# Patient Record
Sex: FEMALE | Race: BLACK OR AFRICAN AMERICAN | NOT HISPANIC OR LATINO
[De-identification: names, ages, dates, MRNs, and addresses within clinical notes are randomized per-mention and may not be internally consistent; named-entity substitution may affect disease eponyms.]

---

## 2017-11-01 PROBLEM — Z00.00 ENCOUNTER FOR PREVENTIVE HEALTH EXAMINATION: Status: ACTIVE | Noted: 2017-11-01

## 2017-11-08 ENCOUNTER — APPOINTMENT (OUTPATIENT)
Dept: GASTROENTEROLOGY | Facility: CLINIC | Age: 72
End: 2017-11-08
Payer: MEDICARE

## 2017-11-08 VITALS
DIASTOLIC BLOOD PRESSURE: 75 MMHG | SYSTOLIC BLOOD PRESSURE: 125 MMHG | OXYGEN SATURATION: 100 % | RESPIRATION RATE: 16 BRPM | HEIGHT: 67 IN | WEIGHT: 175 LBS | BODY MASS INDEX: 27.47 KG/M2 | HEART RATE: 87 BPM

## 2017-11-08 VITALS — BODY MASS INDEX: 25.84 KG/M2 | WEIGHT: 165 LBS

## 2017-11-08 DIAGNOSIS — E73.9 LACTOSE INTOLERANCE, UNSPECIFIED: ICD-10-CM

## 2017-11-08 DIAGNOSIS — Z78.9 OTHER SPECIFIED HEALTH STATUS: ICD-10-CM

## 2017-11-08 DIAGNOSIS — F15.90 OTHER STIMULANT USE, UNSPECIFIED, UNCOMPLICATED: ICD-10-CM

## 2017-11-08 PROCEDURE — 99202 OFFICE O/P NEW SF 15 MIN: CPT

## 2017-11-08 RX ORDER — SODIUM SULFATE, POTASSIUM SULFATE, MAGNESIUM SULFATE 17.5; 3.13; 1.6 G/ML; G/ML; G/ML
17.5-3.13-1.6 SOLUTION, CONCENTRATE ORAL
Qty: 1 | Refills: 0 | Status: ACTIVE | COMMUNITY
Start: 2017-11-08 | End: 1900-01-01

## 2017-12-16 LAB
BASOPHILS # BLD AUTO: 0.01 K/UL
BASOPHILS NFR BLD AUTO: 0.2 %
EOSINOPHIL # BLD AUTO: 0.09 K/UL
EOSINOPHIL NFR BLD AUTO: 2.1 %
HCT VFR BLD CALC: 37.6 %
HGB BLD-MCNC: 12.1 G/DL
IMM GRANULOCYTES NFR BLD AUTO: 0 %
LYMPHOCYTES # BLD AUTO: 1.89 K/UL
LYMPHOCYTES NFR BLD AUTO: 44.7 %
MAN DIFF?: NORMAL
MCHC RBC-ENTMCNC: 28.6 PG
MCHC RBC-ENTMCNC: 32.2 GM/DL
MCV RBC AUTO: 88.9 FL
MONOCYTES # BLD AUTO: 0.33 K/UL
MONOCYTES NFR BLD AUTO: 7.8 %
NEUTROPHILS # BLD AUTO: 1.91 K/UL
NEUTROPHILS NFR BLD AUTO: 45.2 %
PLATELET # BLD AUTO: 204 K/UL
RBC # BLD: 4.23 M/UL
RBC # FLD: 13.8 %
WBC # FLD AUTO: 4.23 K/UL

## 2017-12-18 LAB
ANION GAP SERPL CALC-SCNC: 15 MMOL/L
BUN SERPL-MCNC: 12 MG/DL
CALCIUM SERPL-MCNC: 9.6 MG/DL
CHLORIDE SERPL-SCNC: 106 MMOL/L
CO2 SERPL-SCNC: 25 MMOL/L
CREAT SERPL-MCNC: 0.96 MG/DL
GLUCOSE SERPL-MCNC: 105 MG/DL
INR PPP: 0.98 RATIO
POTASSIUM SERPL-SCNC: 4.1 MMOL/L
PT BLD: 11.1 SEC
SODIUM SERPL-SCNC: 146 MMOL/L

## 2017-12-20 ENCOUNTER — MEDICATION RENEWAL (OUTPATIENT)
Age: 72
End: 2017-12-20

## 2017-12-20 RX ORDER — POLYETHYLENE GLYOCOL 3350, SODIUM CHLORIDE, SODIUM BICARBONATE AND POTASSIUM CHLORIDE 420; 11.2; 5.72; 1.48 G/4L; G/4L; G/4L; G/4L
420 POWDER, FOR SOLUTION NASOGASTRIC; ORAL
Qty: 1 | Refills: 0 | Status: ACTIVE | COMMUNITY
Start: 2017-12-20 | End: 1900-01-01

## 2018-01-02 ENCOUNTER — OUTPATIENT (OUTPATIENT)
Dept: OUTPATIENT SERVICES | Facility: HOSPITAL | Age: 73
LOS: 1 days | End: 2018-01-02
Payer: COMMERCIAL

## 2018-01-02 ENCOUNTER — APPOINTMENT (OUTPATIENT)
Dept: GASTROENTEROLOGY | Facility: GI CENTER | Age: 73
End: 2018-01-02
Payer: MEDICARE

## 2018-01-02 ENCOUNTER — RESULT REVIEW (OUTPATIENT)
Age: 73
End: 2018-01-02

## 2018-01-02 DIAGNOSIS — Z12.11 ENCOUNTER FOR SCREENING FOR MALIGNANT NEOPLASM OF COLON: ICD-10-CM

## 2018-01-02 PROCEDURE — 88305 TISSUE EXAM BY PATHOLOGIST: CPT | Mod: 26

## 2018-01-02 PROCEDURE — 45380 COLONOSCOPY AND BIOPSY: CPT | Mod: PT

## 2018-01-02 PROCEDURE — 45385 COLONOSCOPY W/LESION REMOVAL: CPT | Mod: 33

## 2018-01-02 PROCEDURE — 88305 TISSUE EXAM BY PATHOLOGIST: CPT

## 2018-01-02 RX ORDER — DICLOFENAC SODIUM 100 MG/1
100 TABLET, FILM COATED, EXTENDED RELEASE ORAL
Qty: 10 | Refills: 0 | Status: DISCONTINUED | COMMUNITY
Start: 2017-09-09 | End: 2018-01-02

## 2018-01-02 RX ORDER — LATANOPROST/PF 0.005 %
0.01 DROPS OPHTHALMIC (EYE)
Qty: 2 | Refills: 0 | Status: ACTIVE | COMMUNITY
Start: 2017-04-01

## 2018-01-05 LAB — SURGICAL PATHOLOGY FINAL REPORT - CH: SIGNIFICANT CHANGE UP

## 2022-11-15 ENCOUNTER — OFFICE (OUTPATIENT)
Dept: URBAN - METROPOLITAN AREA CLINIC 94 | Facility: CLINIC | Age: 77
Setting detail: OPHTHALMOLOGY
End: 2022-11-15
Payer: MEDICARE

## 2022-11-15 DIAGNOSIS — Z20.822: ICD-10-CM

## 2022-11-15 DIAGNOSIS — Z01.812: ICD-10-CM

## 2022-11-15 PROCEDURE — 99211 OFF/OP EST MAY X REQ PHY/QHP: CPT | Performed by: OPHTHALMOLOGY

## 2022-11-17 ASSESSMENT — REFRACTION_AUTOREFRACTION
OD_SPHERE: -3.25
OD_AXIS: 096
OD_CYLINDER: -2.25
OS_SPHERE: -1.75
OS_CYLINDER: -2.25
OS_AXIS: 089

## 2022-11-17 ASSESSMENT — REFRACTION_CURRENTRX
OS_OVR_VA: 20/
OS_VPRISM_DIRECTION: PROGS
OD_VPRISM_DIRECTION: PROGS
OD_SPHERE: -4.00
OS_VPRISM_DIRECTION: PROGS
OD_AXIS: 092
OS_ADD: +3.50
OS_OVR_VA: 20/
OS_AXIS: 083
OS_ADD: +1.50
OD_ADD: +1.50
OS_CYLINDER: -0.50
OS_AXIS: 105
OD_AXIS: 091
OD_CYLINDER: -0.75
OD_OVR_VA: 20/
OS_SPHERE: -1.50
OD_SPHERE: -3.00
OD_CYLINDER: -1.25
OS_CYLINDER: -1.25
OD_OVR_VA: 20/
OS_SPHERE: -3.25
OD_VPRISM_DIRECTION: SV

## 2022-11-17 ASSESSMENT — AXIALLENGTH_DERIVED
OD_AL: 23.4043
OS_AL: 22.7494
OS_AL: 23.0709
OD_AL: 23.5976
OS_AL: 23.0709
OD_AL: 23.5976

## 2022-11-17 ASSESSMENT — VISUAL ACUITY
OS_BCVA: 20/80+1
OD_BCVA: 20/30

## 2022-11-17 ASSESSMENT — REFRACTION_MANIFEST
OD_VA2: 20/J1
OD_ADD: +2.00
OD_SPHERE: -3.25
OS_VA1: 20/20
OS_VA1: 20/30
OS_CYLINDER: -2.25
OS_AXIS: 089
OS_AXIS: 081
OD_AXIS: 099
OD_VA1: 20/40
OD_VA1: 20/80
OD_AXIS: 096
OD_SPHERE: -3.25
OU_VA: 20/20
OS_VA2: 20/J1
OD_CYLINDER: -2.25
OS_SPHERE: -1.50
OS_CYLINDER: -1.00
OS_ADD: +2.00
OD_CYLINDER: -1.25
OS_SPHERE: -1.75

## 2022-11-17 ASSESSMENT — SUPERFICIAL PUNCTATE KERATITIS (SPK)
OS_SPK: 2+
OD_SPK: 2+

## 2022-11-17 ASSESSMENT — SPHEQUIV_DERIVED
OD_SPHEQUIV: -4.375
OD_SPHEQUIV: -4.375
OS_SPHEQUIV: -2.875
OD_SPHEQUIV: -3.875
OS_SPHEQUIV: -2
OS_SPHEQUIV: -2.875

## 2022-11-17 ASSESSMENT — KERATOMETRY
OD_K1POWER_DIOPTERS: 47.50
METHOD_AUTO_MANUAL: AUTO
OS_K2POWER_DIOPTERS: 48.50
OD_K2POWER_DIOPTERS: 48.75
OS_AXISANGLE_DEGREES: 150
OS_K1POWER_DIOPTERS: 47.50
OD_AXISANGLE_DEGREES: 176

## 2022-11-18 ENCOUNTER — ASC (OUTPATIENT)
Dept: URBAN - METROPOLITAN AREA SURGERY 8 | Facility: SURGERY | Age: 77
Setting detail: OPHTHALMOLOGY
End: 2022-11-18
Payer: MEDICARE

## 2022-11-18 DIAGNOSIS — H26.492: ICD-10-CM

## 2022-11-18 PROCEDURE — 66821 AFTER CATARACT LASER SURGERY: CPT | Performed by: OPHTHALMOLOGY

## 2022-11-18 ASSESSMENT — REFRACTION_CURRENTRX
OS_VPRISM_DIRECTION: PROGS
OD_SPHERE: -4.00
OD_AXIS: 092
OS_OVR_VA: 20/
OD_OVR_VA: 20/
OS_CYLINDER: -0.50
OD_AXIS: 091
OS_ADD: +3.50
OD_ADD: +1.50
OS_AXIS: 105
OD_VPRISM_DIRECTION: PROGS
OD_OVR_VA: 20/
OD_CYLINDER: -0.75
OS_SPHERE: -1.50
OS_CYLINDER: -1.25
OS_OVR_VA: 20/
OS_AXIS: 083
OD_VPRISM_DIRECTION: SV
OS_VPRISM_DIRECTION: PROGS
OD_SPHERE: -3.00
OD_CYLINDER: -1.25
OS_ADD: +1.50
OS_SPHERE: -3.25

## 2022-11-18 ASSESSMENT — KERATOMETRY
OS_AXISANGLE_DEGREES: 150
METHOD_AUTO_MANUAL: AUTO
OS_K1POWER_DIOPTERS: 47.50
OD_K1POWER_DIOPTERS: 47.50
OS_K2POWER_DIOPTERS: 48.50
OD_K2POWER_DIOPTERS: 48.75
OD_AXISANGLE_DEGREES: 176

## 2022-11-18 ASSESSMENT — REFRACTION_AUTOREFRACTION
OD_SPHERE: -3.25
OS_SPHERE: -1.75
OD_AXIS: 096
OS_CYLINDER: -2.25
OS_AXIS: 089
OD_CYLINDER: -2.25

## 2022-11-18 ASSESSMENT — REFRACTION_MANIFEST
OS_AXIS: 089
OD_SPHERE: -3.25
OD_CYLINDER: -2.25
OD_AXIS: 099
OS_ADD: +2.00
OU_VA: 20/20
OD_VA1: 20/40
OS_AXIS: 081
OS_SPHERE: -1.75
OS_VA2: 20/J1
OS_VA1: 20/30
OD_VA2: 20/J1
OS_VA1: 20/20
OS_CYLINDER: -1.00
OD_VA1: 20/80
OD_AXIS: 096
OS_SPHERE: -1.50
OS_CYLINDER: -2.25
OD_ADD: +2.00
OD_CYLINDER: -1.25
OD_SPHERE: -3.25

## 2022-11-18 ASSESSMENT — AXIALLENGTH_DERIVED
OD_AL: 23.5976
OS_AL: 22.7494
OS_AL: 23.0709
OD_AL: 23.5976
OD_AL: 23.4043
OS_AL: 23.0709

## 2022-11-18 ASSESSMENT — SPHEQUIV_DERIVED
OS_SPHEQUIV: -2
OS_SPHEQUIV: -2.875
OD_SPHEQUIV: -4.375
OD_SPHEQUIV: -3.875
OS_SPHEQUIV: -2.875
OD_SPHEQUIV: -4.375

## 2022-11-18 ASSESSMENT — VISUAL ACUITY
OD_BCVA: 20/30
OS_BCVA: 20/80+1

## 2022-12-23 ENCOUNTER — OFFICE (OUTPATIENT)
Dept: URBAN - METROPOLITAN AREA CLINIC 112 | Facility: CLINIC | Age: 77
Setting detail: OPHTHALMOLOGY
End: 2022-12-23

## 2022-12-23 DIAGNOSIS — Z96.1: ICD-10-CM

## 2022-12-23 DIAGNOSIS — H40.1122: ICD-10-CM

## 2022-12-23 DIAGNOSIS — H16.223: ICD-10-CM

## 2022-12-23 DIAGNOSIS — H40.1113: ICD-10-CM

## 2022-12-23 DIAGNOSIS — H26.492: ICD-10-CM

## 2022-12-23 PROCEDURE — 99024 POSTOP FOLLOW-UP VISIT: CPT | Performed by: OPHTHALMOLOGY

## 2022-12-23 ASSESSMENT — AXIALLENGTH_DERIVED
OS_AL: 22.8377
OS_AL: 22.7922
OD_AL: 23.6032
OD_AL: 23.4098
OD_AL: 23.5061
OS_AL: 23.1149
OD_AL: 23.3143
OS_AL: 23.0218

## 2022-12-23 ASSESSMENT — PACHYMETRY
OD_CT_CORRECTION: 3
OS_CT_UM: 479
OD_CT_UM: 508
OS_CT_CORRECTION: 5

## 2022-12-23 ASSESSMENT — KERATOMETRY
OS_K2POWER_DIOPTERS: 48.50
OD_K2POWER_DIOPTERS: 49.25
OS_K1POWER_DIOPTERS: 47.25
OD_AXISANGLE_DEGREES: 007
METHOD_AUTO_MANUAL: AUTO
OS_AXISANGLE_DEGREES: 170
OD_K1POWER_DIOPTERS: 47.50

## 2022-12-23 ASSESSMENT — REFRACTION_CURRENTRX
OS_AXIS: 083
OD_ADD: +1.75
OS_CYLINDER: -0.50
OS_CYLINDER: -1.25
OD_AXIS: 091
OD_VPRISM_DIRECTION: PROGS
OS_VPRISM_DIRECTION: PROGS
OS_SPHERE: -1.50
OS_OVR_VA: 20/
OD_VPRISM_DIRECTION: PROGS
OD_SPHERE: -3.00
OD_SPHERE: -3.00
OD_SPHERE: -4.00
OS_OVR_VA: 20/
OS_ADD: +2.00
OD_ADD: +1.50
OD_CYLINDER: -1.25
OS_CYLINDER: -1.00
OD_AXIS: 092
OD_CYLINDER: -1.25
OS_AXIS: 082
OS_AXIS: 105
OS_ADD: +3.50
OS_VPRISM_DIRECTION: PROGS
OS_ADD: +1.50
OD_AXIS: 100
OD_CYLINDER: -0.75
OD_OVR_VA: 20/
OD_VPRISM_DIRECTION: SV
OS_SPHERE: -3.25
OD_OVR_VA: 20/
OD_OVR_VA: 20/
OS_VPRISM_DIRECTION: PROGS
OS_OVR_VA: 20/
OS_SPHERE: -1.50

## 2022-12-23 ASSESSMENT — SUPERFICIAL PUNCTATE KERATITIS (SPK)
OD_SPK: 2+
OS_SPK: 2+

## 2022-12-23 ASSESSMENT — SPHEQUIV_DERIVED
OD_SPHEQUIV: -4.375
OS_SPHEQUIV: -2.125
OD_SPHEQUIV: -4.125
OD_SPHEQUIV: -3.875
OS_SPHEQUIV: -2
OS_SPHEQUIV: -2.875
OS_SPHEQUIV: -2.625
OD_SPHEQUIV: -4.625

## 2022-12-23 ASSESSMENT — REFRACTION_MANIFEST
OD_AXIS: 090
OD_VA1: 20/40
OS_CYLINDER: -1.00
OS_VA1: 20/30
OD_CYLINDER: -1.75
OS_SPHERE: -1.00
OD_AXIS: 099
OD_CYLINDER: -1.25
OS_AXIS: 085
OS_SPHERE: -1.75
OS_VA1: 20/20
OD_CYLINDER: -2.25
OS_AXIS: 089
OD_SPHERE: -3.25
OS_ADD: +2.00
OS_VA1: 20/20
OD_ADD: +2.00
OS_VA2: 20/J1
OS_AXIS: 081
OU_VA: 20/20
OD_VA1: 20/80
OD_ADD: +2.00
OD_SPHERE: -3.25
OS_ADD: +2.00
OS_CYLINDER: -2.25
OD_AXIS: 096
OS_CYLINDER: -2.25
OD_VA2: 20/J1
OD_SPHERE: -3.25
OS_SPHERE: -1.50
OD_VA1: 20/100

## 2022-12-23 ASSESSMENT — REFRACTION_AUTOREFRACTION
OD_AXIS: 093
OS_SPHERE: -1.50
OS_CYLINDER: -2.25
OD_CYLINDER: -2.25
OS_AXIS: 085
OD_SPHERE: -3.50

## 2022-12-23 ASSESSMENT — VISUAL ACUITY
OD_BCVA: 20/30-1
OS_BCVA: 20/100-1

## 2022-12-23 ASSESSMENT — CONFRONTATIONAL VISUAL FIELD TEST (CVF)
OD_FINDINGS: FULL
OS_FINDINGS: FULL

## 2022-12-23 ASSESSMENT — TONOMETRY
OD_IOP_MMHG: 13
OS_IOP_MMHG: 13

## 2023-04-01 ENCOUNTER — OFFICE (OUTPATIENT)
Dept: URBAN - METROPOLITAN AREA CLINIC 112 | Facility: CLINIC | Age: 78
Setting detail: OPHTHALMOLOGY
End: 2023-04-01
Payer: MEDICARE

## 2023-04-01 DIAGNOSIS — H40.1113: ICD-10-CM

## 2023-04-01 DIAGNOSIS — H26.492: ICD-10-CM

## 2023-04-01 DIAGNOSIS — H16.223: ICD-10-CM

## 2023-04-01 DIAGNOSIS — H40.1122: ICD-10-CM

## 2023-04-01 PROCEDURE — 92020 GONIOSCOPY: CPT | Performed by: OPHTHALMOLOGY

## 2023-04-01 PROCEDURE — 92083 EXTENDED VISUAL FIELD XM: CPT | Performed by: OPHTHALMOLOGY

## 2023-04-01 PROCEDURE — 99214 OFFICE O/P EST MOD 30 MIN: CPT | Performed by: OPHTHALMOLOGY

## 2023-04-01 ASSESSMENT — REFRACTION_CURRENTRX
OD_OVR_VA: 20/
OD_CYLINDER: -1.25
OD_ADD: +1.75
OS_CYLINDER: -0.50
OS_AXIS: 083
OD_VPRISM_DIRECTION: SV
OD_SPHERE: -4.00
OS_VPRISM_DIRECTION: PROGS
OD_AXIS: 100
OS_CYLINDER: -1.25
OD_ADD: +1.50
OD_CYLINDER: -0.75
OD_AXIS: 092
OS_ADD: +2.00
OS_OVR_VA: 20/
OS_VPRISM_DIRECTION: PROGS
OS_ADD: +3.50
OS_SPHERE: -1.50
OD_SPHERE: -3.00
OD_SPHERE: -3.00
OS_OVR_VA: 20/
OS_VPRISM_DIRECTION: PROGS
OS_CYLINDER: -1.00
OD_AXIS: 091
OD_CYLINDER: -1.25
OS_SPHERE: -1.50
OS_SPHERE: -3.25
OS_ADD: +1.50
OS_OVR_VA: 20/
OD_OVR_VA: 20/
OD_VPRISM_DIRECTION: PROGS
OS_AXIS: 105
OD_VPRISM_DIRECTION: PROGS
OS_AXIS: 082
OD_OVR_VA: 20/

## 2023-04-01 ASSESSMENT — REFRACTION_MANIFEST
OD_CYLINDER: -2.25
OS_AXIS: 085
OS_SPHERE: -1.50
OS_VA2: 20/J1
OD_ADD: +2.00
OD_CYLINDER: -1.75
OS_AXIS: 089
OD_SPHERE: -3.25
OS_ADD: +2.00
OS_AXIS: 081
OS_CYLINDER: -1.00
OD_VA1: 20/40
OS_ADD: +2.00
OS_VA1: 20/20
OD_AXIS: 096
OS_SPHERE: -1.75
OD_AXIS: 099
OD_CYLINDER: -1.25
OD_SPHERE: -3.25
OD_SPHERE: -3.25
OS_CYLINDER: -2.25
OS_VA1: 20/30
OD_VA2: 20/J1
OD_VA1: 20/80
OS_CYLINDER: -2.25
OD_VA1: 20/100
OU_VA: 20/20
OD_AXIS: 090
OD_ADD: +2.00
OS_SPHERE: -1.00
OS_VA1: 20/20

## 2023-04-01 ASSESSMENT — PACHYMETRY
OS_CT_UM: 479
OD_CT_CORRECTION: 3
OD_CT_UM: 508
OS_CT_CORRECTION: 5

## 2023-04-01 ASSESSMENT — SPHEQUIV_DERIVED
OS_SPHEQUIV: -2.125
OS_SPHEQUIV: -2.875
OD_SPHEQUIV: -4.375
OD_SPHEQUIV: -4.75
OS_SPHEQUIV: -2
OS_SPHEQUIV: -2.5
OD_SPHEQUIV: -3.875
OD_SPHEQUIV: -4.125

## 2023-04-01 ASSESSMENT — AXIALLENGTH_DERIVED
OD_AL: 23.652
OS_AL: 22.932
OS_AL: 22.7948
OS_AL: 23.0709
OD_AL: 23.4098
OD_AL: 23.5061
OD_AL: 23.3143
OS_AL: 22.7494

## 2023-04-01 ASSESSMENT — REFRACTION_AUTOREFRACTION
OS_CYLINDER: -2.50
OS_AXIS: 093
OD_AXIS: 100
OD_SPHERE: -3.75
OS_SPHERE: -1.25
OD_CYLINDER: -2.00

## 2023-04-01 ASSESSMENT — KERATOMETRY
OS_K2POWER_DIOPTERS: 48.75
OS_K1POWER_DIOPTERS: 47.25
METHOD_AUTO_MANUAL: AUTO
OD_K1POWER_DIOPTERS: 47.75
OS_AXISANGLE_DEGREES: 176
OD_K2POWER_DIOPTERS: 49.00
OD_AXISANGLE_DEGREES: 021

## 2023-04-01 ASSESSMENT — VISUAL ACUITY
OS_BCVA: 20/150
OD_BCVA: 20/25-1

## 2023-04-01 ASSESSMENT — SUPERFICIAL PUNCTATE KERATITIS (SPK)
OS_SPK: 2+
OD_SPK: 2+

## 2023-04-01 ASSESSMENT — CONFRONTATIONAL VISUAL FIELD TEST (CVF)
OD_FINDINGS: FULL
OS_FINDINGS: FULL

## 2023-04-01 ASSESSMENT — TONOMETRY
OS_IOP_MMHG: 13
OD_IOP_MMHG: 16

## 2023-04-11 ENCOUNTER — RX ONLY (RX ONLY)
Age: 78
End: 2023-04-11

## 2023-04-11 RX ORDER — LATANOPROST 50 UG/ML
SOLUTION/ DROPS OPHTHALMIC
Qty: 15 | Refills: 3 | Status: ACTIVE | OUTPATIENT

## 2023-04-27 ENCOUNTER — ASC (OUTPATIENT)
Dept: URBAN - METROPOLITAN AREA SURGERY 8 | Facility: SURGERY | Age: 78
Setting detail: OPHTHALMOLOGY
End: 2023-04-27
Payer: MEDICARE

## 2023-04-27 DIAGNOSIS — H40.1113: ICD-10-CM

## 2023-04-27 PROCEDURE — 65855 TRABECULOPLASTY LASER SURG: CPT | Performed by: OPHTHALMOLOGY

## 2023-04-27 ASSESSMENT — REFRACTION_MANIFEST
OD_ADD: +2.00
OS_CYLINDER: -2.25
OD_AXIS: 090
OD_AXIS: 099
OD_VA1: 20/100
OS_CYLINDER: -2.25
OS_ADD: +2.00
OS_AXIS: 085
OD_VA1: 20/80
OD_SPHERE: -3.25
OD_SPHERE: -3.25
OS_AXIS: 081
OS_VA1: 20/30
OD_CYLINDER: -2.25
OU_VA: 20/20
OD_CYLINDER: -1.75
OD_CYLINDER: -1.25
OD_SPHERE: -3.25
OD_VA2: 20/J1
OS_VA1: 20/20
OS_ADD: +2.00
OD_VA1: 20/40
OS_VA2: 20/J1
OS_SPHERE: -1.75
OS_VA1: 20/20
OS_AXIS: 089
OS_SPHERE: -1.00
OS_SPHERE: -1.50
OD_ADD: +2.00
OS_CYLINDER: -1.00
OD_AXIS: 096

## 2023-04-27 ASSESSMENT — REFRACTION_CURRENTRX
OS_CYLINDER: -0.50
OS_SPHERE: -3.25
OD_SPHERE: -3.00
OS_VPRISM_DIRECTION: PROGS
OS_ADD: +1.50
OD_VPRISM_DIRECTION: PROGS
OS_OVR_VA: 20/
OD_CYLINDER: -0.75
OS_AXIS: 105
OD_OVR_VA: 20/
OS_ADD: +3.50
OD_OVR_VA: 20/
OD_OVR_VA: 20/
OS_ADD: +2.00
OS_VPRISM_DIRECTION: PROGS
OD_AXIS: 092
OS_SPHERE: -1.50
OS_OVR_VA: 20/
OS_SPHERE: -1.50
OD_ADD: +1.75
OD_VPRISM_DIRECTION: PROGS
OD_CYLINDER: -1.25
OD_ADD: +1.50
OS_AXIS: 082
OD_VPRISM_DIRECTION: SV
OS_OVR_VA: 20/
OS_CYLINDER: -1.25
OD_SPHERE: -4.00
OD_SPHERE: -3.00
OS_VPRISM_DIRECTION: PROGS
OS_CYLINDER: -1.00
OD_AXIS: 100
OD_CYLINDER: -1.25
OD_AXIS: 091
OS_AXIS: 083

## 2023-04-27 ASSESSMENT — SPHEQUIV_DERIVED
OD_SPHEQUIV: -4.375
OS_SPHEQUIV: -2.875
OD_SPHEQUIV: -4.125
OD_SPHEQUIV: -3.875
OD_SPHEQUIV: -4.75
OS_SPHEQUIV: -2
OS_SPHEQUIV: -2.125
OS_SPHEQUIV: -2.5

## 2023-04-27 ASSESSMENT — KERATOMETRY
OS_K2POWER_DIOPTERS: 48.75
OS_AXISANGLE_DEGREES: 176
METHOD_AUTO_MANUAL: AUTO
OD_K2POWER_DIOPTERS: 49.00
OS_K1POWER_DIOPTERS: 47.25
OD_AXISANGLE_DEGREES: 021
OD_K1POWER_DIOPTERS: 47.75

## 2023-04-27 ASSESSMENT — AXIALLENGTH_DERIVED
OS_AL: 22.7948
OD_AL: 23.4098
OS_AL: 22.7494
OD_AL: 23.3143
OD_AL: 23.5061
OS_AL: 22.932
OS_AL: 23.0709
OD_AL: 23.652

## 2023-04-27 ASSESSMENT — REFRACTION_AUTOREFRACTION
OS_SPHERE: -1.25
OD_SPHERE: -3.75
OD_CYLINDER: -2.00
OS_CYLINDER: -2.50
OS_AXIS: 093
OD_AXIS: 100

## 2023-04-27 ASSESSMENT — VISUAL ACUITY
OS_BCVA: 20/150
OD_BCVA: 20/25-1

## 2023-04-28 ENCOUNTER — ASC (OUTPATIENT)
Dept: URBAN - METROPOLITAN AREA SURGERY 8 | Facility: SURGERY | Age: 78
Setting detail: OPHTHALMOLOGY
End: 2023-04-28
Payer: MEDICARE

## 2023-04-28 DIAGNOSIS — H40.1123: ICD-10-CM

## 2023-04-28 PROCEDURE — 65855 TRABECULOPLASTY LASER SURG: CPT | Performed by: OPHTHALMOLOGY

## 2023-04-28 ASSESSMENT — REFRACTION_MANIFEST
OD_SPHERE: -3.25
OS_VA2: 20/J1
OS_SPHERE: -1.00
OS_AXIS: 085
OD_SPHERE: -3.25
OD_AXIS: 090
OS_CYLINDER: -2.25
OS_CYLINDER: -2.25
OS_VA1: 20/30
OS_ADD: +2.00
OD_VA1: 20/80
OD_SPHERE: -3.25
OD_AXIS: 099
OS_SPHERE: -1.50
OS_VA1: 20/20
OS_SPHERE: -1.75
OD_VA1: 20/100
OD_CYLINDER: -1.75
OD_ADD: +2.00
OS_CYLINDER: -1.00
OD_CYLINDER: -2.25
OD_ADD: +2.00
OD_VA2: 20/J1
OS_AXIS: 089
OU_VA: 20/20
OS_AXIS: 081
OD_CYLINDER: -1.25
OD_AXIS: 096
OD_VA1: 20/40
OS_ADD: +2.00
OS_VA1: 20/20

## 2023-04-28 ASSESSMENT — REFRACTION_CURRENTRX
OD_CYLINDER: -1.25
OS_ADD: +1.50
OS_OVR_VA: 20/
OD_AXIS: 092
OD_CYLINDER: -1.25
OS_CYLINDER: -1.00
OD_VPRISM_DIRECTION: SV
OD_OVR_VA: 20/
OD_ADD: +1.75
OS_SPHERE: -1.50
OD_AXIS: 100
OS_AXIS: 105
OS_CYLINDER: -0.50
OS_ADD: +3.50
OS_SPHERE: -1.50
OD_SPHERE: -3.00
OS_SPHERE: -3.25
OD_VPRISM_DIRECTION: PROGS
OD_OVR_VA: 20/
OD_AXIS: 091
OD_SPHERE: -3.00
OS_CYLINDER: -1.25
OS_AXIS: 082
OS_VPRISM_DIRECTION: PROGS
OS_ADD: +2.00
OS_OVR_VA: 20/
OS_VPRISM_DIRECTION: PROGS
OD_VPRISM_DIRECTION: PROGS
OD_CYLINDER: -0.75
OS_VPRISM_DIRECTION: PROGS
OD_SPHERE: -4.00
OD_ADD: +1.50
OD_OVR_VA: 20/
OS_OVR_VA: 20/
OS_AXIS: 083

## 2023-04-28 ASSESSMENT — VISUAL ACUITY
OS_BCVA: 20/150
OD_BCVA: 20/25-1

## 2023-04-28 ASSESSMENT — AXIALLENGTH_DERIVED
OD_AL: 23.652
OD_AL: 23.3143
OS_AL: 22.7494
OD_AL: 23.5061
OS_AL: 22.932
OS_AL: 22.7948
OD_AL: 23.4098
OS_AL: 23.0709

## 2023-04-28 ASSESSMENT — KERATOMETRY
OD_AXISANGLE_DEGREES: 021
OS_K1POWER_DIOPTERS: 47.25
OD_K1POWER_DIOPTERS: 47.75
OS_AXISANGLE_DEGREES: 176
OD_K2POWER_DIOPTERS: 49.00
METHOD_AUTO_MANUAL: AUTO
OS_K2POWER_DIOPTERS: 48.75

## 2023-04-28 ASSESSMENT — SPHEQUIV_DERIVED
OD_SPHEQUIV: -4.75
OD_SPHEQUIV: -4.125
OS_SPHEQUIV: -2.5
OS_SPHEQUIV: -2.875
OD_SPHEQUIV: -4.375
OD_SPHEQUIV: -3.875
OS_SPHEQUIV: -2.125
OS_SPHEQUIV: -2

## 2023-04-28 ASSESSMENT — REFRACTION_AUTOREFRACTION
OD_AXIS: 100
OS_AXIS: 093
OD_CYLINDER: -2.00
OS_CYLINDER: -2.50
OD_SPHERE: -3.75
OS_SPHERE: -1.25

## 2023-06-09 ENCOUNTER — OFFICE (OUTPATIENT)
Dept: URBAN - METROPOLITAN AREA CLINIC 112 | Facility: CLINIC | Age: 78
Setting detail: OPHTHALMOLOGY
End: 2023-06-09
Payer: MEDICARE

## 2023-06-09 ENCOUNTER — RX ONLY (RX ONLY)
Age: 78
End: 2023-06-09

## 2023-06-09 DIAGNOSIS — H40.1123: ICD-10-CM

## 2023-06-09 PROCEDURE — 99213 OFFICE O/P EST LOW 20 MIN: CPT | Performed by: OPHTHALMOLOGY

## 2023-06-09 ASSESSMENT — CONFRONTATIONAL VISUAL FIELD TEST (CVF)
OS_FINDINGS: FULL
OD_FINDINGS: FULL

## 2023-06-09 ASSESSMENT — REFRACTION_CURRENTRX
OS_SPHERE: -3.25
OD_VPRISM_DIRECTION: PROGS
OS_SPHERE: -1.50
OS_OVR_VA: 20/
OD_AXIS: 091
OS_AXIS: 105
OS_ADD: +1.50
OD_CYLINDER: -1.25
OD_OVR_VA: 20/
OS_OVR_VA: 20/
OD_VPRISM_DIRECTION: PROGS
OS_ADD: +2.00
OS_OVR_VA: 20/
OD_OVR_VA: 20/
OS_VPRISM_DIRECTION: PROGS
OD_CYLINDER: -0.75
OD_VPRISM_DIRECTION: SV
OS_CYLINDER: -1.00
OD_CYLINDER: -1.25
OS_VPRISM_DIRECTION: PROGS
OS_ADD: +3.50
OD_ADD: +1.75
OS_CYLINDER: -0.50
OD_SPHERE: -4.00
OS_SPHERE: -1.50
OS_AXIS: 082
OD_AXIS: 092
OD_SPHERE: -3.00
OD_ADD: +1.50
OS_CYLINDER: -1.25
OD_OVR_VA: 20/
OS_VPRISM_DIRECTION: PROGS
OD_SPHERE: -3.00
OD_AXIS: 100
OS_AXIS: 083

## 2023-06-09 ASSESSMENT — KERATOMETRY
OD_K1POWER_DIOPTERS: 47.25
OS_K1POWER_DIOPTERS: 47.25
OS_AXISANGLE_DEGREES: 002
OS_K2POWER_DIOPTERS: 48.00
METHOD_AUTO_MANUAL: AUTO
OD_AXISANGLE_DEGREES: 017
OD_K2POWER_DIOPTERS: 48.50

## 2023-06-09 ASSESSMENT — REFRACTION_MANIFEST
OS_VA2: 20/J1
OS_VA1: 20/30
OS_VA1: 20/20
OD_ADD: +2.00
OD_VA1: 20/40
OS_AXIS: 085
OS_AXIS: 089
OS_CYLINDER: -2.25
OU_VA: 20/20
OD_CYLINDER: -1.75
OS_ADD: +2.00
OS_VA1: 20/20
OS_SPHERE: -1.50
OD_SPHERE: -3.25
OS_CYLINDER: -2.25
OD_VA1: 20/100
OD_CYLINDER: -2.25
OD_CYLINDER: -1.25
OS_AXIS: 081
OD_AXIS: 099
OD_VA1: 20/80
OS_ADD: +2.00
OS_SPHERE: -1.00
OS_CYLINDER: -1.00
OD_SPHERE: -3.25
OD_VA2: 20/J1
OD_ADD: +2.00
OD_AXIS: 090
OD_AXIS: 096
OD_SPHERE: -3.25
OS_SPHERE: -1.75

## 2023-06-09 ASSESSMENT — REFRACTION_AUTOREFRACTION
OS_CYLINDER: -1.75
OD_CYLINDER: -1.75
OD_SPHERE: -3.50
OD_AXIS: 101
OS_AXIS: 089
OS_SPHERE: -1.25

## 2023-06-09 ASSESSMENT — SPHEQUIV_DERIVED
OS_SPHEQUIV: -2.875
OS_SPHEQUIV: -2.125
OD_SPHEQUIV: -4.375
OS_SPHEQUIV: -2
OD_SPHEQUIV: -3.875
OD_SPHEQUIV: -4.375
OS_SPHEQUIV: -2.125
OD_SPHEQUIV: -4.125

## 2023-06-09 ASSESSMENT — PACHYMETRY
OD_CT_UM: 508
OS_CT_CORRECTION: 5
OS_CT_UM: 479
OD_CT_CORRECTION: 3

## 2023-06-09 ASSESSMENT — SUPERFICIAL PUNCTATE KERATITIS (SPK)
OD_SPK: 2+
OS_SPK: 2+

## 2023-06-09 ASSESSMENT — VISUAL ACUITY
OS_BCVA: 20/100-1
OD_BCVA: 20/30+1

## 2023-06-09 ASSESSMENT — TONOMETRY
OD_IOP_MMHG: 11
OS_IOP_MMHG: 10

## 2023-06-09 ASSESSMENT — AXIALLENGTH_DERIVED
OD_AL: 23.592
OD_AL: 23.495
OS_AL: 22.8782
OS_AL: 23.2034
OD_AL: 23.6899
OD_AL: 23.6899
OS_AL: 22.9241
OS_AL: 22.9241

## 2023-06-29 ENCOUNTER — APPOINTMENT (OUTPATIENT)
Dept: GASTROENTEROLOGY | Facility: CLINIC | Age: 78
End: 2023-06-29
Payer: MEDICARE

## 2023-06-29 ENCOUNTER — NON-APPOINTMENT (OUTPATIENT)
Age: 78
End: 2023-06-29

## 2023-06-29 VITALS
HEIGHT: 67 IN | OXYGEN SATURATION: 98 % | HEART RATE: 70 BPM | BODY MASS INDEX: 25.9 KG/M2 | WEIGHT: 165 LBS | RESPIRATION RATE: 16 BRPM | SYSTOLIC BLOOD PRESSURE: 125 MMHG | DIASTOLIC BLOOD PRESSURE: 70 MMHG

## 2023-06-29 PROCEDURE — 99204 OFFICE O/P NEW MOD 45 MIN: CPT

## 2023-06-29 RX ORDER — SODIUM SULFATE, POTASSIUM SULFATE AND MAGNESIUM SULFATE 1.6; 3.13; 17.5 G/177ML; G/177ML; G/177ML
17.5-3.13-1.6 SOLUTION ORAL
Qty: 2 | Refills: 0 | Status: ACTIVE | COMMUNITY
Start: 2023-06-29 | End: 1900-01-01

## 2023-06-29 NOTE — PHYSICAL EXAM
[Alert] : alert [Normal Voice/Communication] : normal voice/communication [No Acute Distress] : no acute distress [Sclera] : the sclera and conjunctiva were normal [Hearing Threshold Finger Rub Not [place]] : hearing was normal [Normal Lips/Gums] : the lips and gums were normal [Oropharynx] : the oropharynx was normal [Normal Appearance] : the appearance of the neck was normal [No Neck Mass] : no neck mass was observed [No Respiratory Distress] : no respiratory distress [No Acc Muscle Use] : no accessory muscle use [Respiration, Rhythm And Depth] : normal respiratory rhythm and effort [Auscultation Breath Sounds / Voice Sounds] : lungs were clear to auscultation bilaterally [Heart Rate And Rhythm] : heart rate was normal and rhythm regular [None] : no edema [Bowel Sounds] : normal bowel sounds [Abdomen Tenderness] : non-tender [No Masses] : no abdominal mass palpated [Abdomen Soft] : soft [Cervical Lymph Nodes Enlarged Posterior Bilaterally] : no posterior cervical lymphadenopathy [Supraclavicular Lymph Nodes Enlarged Bilaterally] : no supraclavicular lymphadenopathy [Cervical Lymph Nodes Enlarged Anterior Bilaterally] : no anterior cervical lymphadenopathy [No CVA Tenderness] : no CVA  tenderness [No Spinal Tenderness] : no spinal tenderness [Abnormal Walk] : normal gait [Normal Color / Pigmentation] : normal skin color and pigmentation [No Focal Deficits] : no focal deficits [Oriented To Time, Place, And Person] : oriented to person, place, and time

## 2023-06-29 NOTE — ASSESSMENT
[FreeTextEntry1] : 77-year-old female with a history of tubular adenomas on colonoscopy in 2018 presents for surveillance colonoscopy evaluation\par \par \par History of tubular adenoma\par Surveillance colonoscopy recommended\par Procedure and instructions reviewed with patient\par Bowel prep sent to pharmacy

## 2023-07-01 ENCOUNTER — OFFICE (OUTPATIENT)
Dept: URBAN - METROPOLITAN AREA CLINIC 112 | Facility: CLINIC | Age: 78
Setting detail: OPHTHALMOLOGY
End: 2023-07-01
Payer: MEDICARE

## 2023-07-01 DIAGNOSIS — H40.1123: ICD-10-CM

## 2023-07-01 PROCEDURE — 92133 CPTRZD OPH DX IMG PST SGM ON: CPT | Performed by: OPHTHALMOLOGY

## 2023-07-01 PROCEDURE — 92012 INTRM OPH EXAM EST PATIENT: CPT | Performed by: OPHTHALMOLOGY

## 2023-07-01 ASSESSMENT — REFRACTION_CURRENTRX
OD_OVR_VA: 20/
OS_OVR_VA: 20/
OD_CYLINDER: -1.25
OS_ADD: +3.50
OS_AXIS: 082
OS_SPHERE: -3.25
OS_ADD: +1.50
OD_VPRISM_DIRECTION: SV
OS_SPHERE: -1.50
OD_SPHERE: -3.00
OD_OVR_VA: 20/
OS_CYLINDER: -1.25
OS_OVR_VA: 20/
OD_VPRISM_DIRECTION: PROGS
OS_CYLINDER: -1.00
OS_VPRISM_DIRECTION: PROGS
OD_ADD: +1.75
OS_AXIS: 083
OS_AXIS: 105
OD_CYLINDER: -0.75
OD_ADD: +1.50
OS_VPRISM_DIRECTION: PROGS
OS_SPHERE: -1.50
OD_AXIS: 091
OS_ADD: +2.00
OS_VPRISM_DIRECTION: PROGS
OD_CYLINDER: -1.25
OD_AXIS: 100
OD_SPHERE: -3.00
OD_OVR_VA: 20/
OD_AXIS: 092
OS_CYLINDER: -0.50
OD_VPRISM_DIRECTION: PROGS
OS_OVR_VA: 20/
OD_SPHERE: -4.00

## 2023-07-01 ASSESSMENT — PACHYMETRY
OS_CT_UM: 479
OD_CT_CORRECTION: 3
OD_CT_UM: 508
OS_CT_CORRECTION: 5

## 2023-07-01 ASSESSMENT — REFRACTION_AUTOREFRACTION
OD_CYLINDER: -2.25
OS_CYLINDER: -1.50
OS_SPHERE: -1.50
OS_AXIS: 093
OD_AXIS: 104
OD_SPHERE: -3.25

## 2023-07-01 ASSESSMENT — REFRACTION_MANIFEST
OS_CYLINDER: -2.25
OS_SPHERE: -1.00
OD_AXIS: 090
OU_VA: 20/20
OD_AXIS: 096
OD_ADD: +2.00
OS_VA2: 20/J1
OD_CYLINDER: -2.25
OS_ADD: +2.00
OS_AXIS: 089
OD_AXIS: 099
OD_VA1: 20/80
OD_CYLINDER: -1.25
OD_SPHERE: -3.25
OD_VA1: 20/100
OS_SPHERE: -1.75
OS_SPHERE: -1.50
OD_SPHERE: -3.25
OS_VA1: 20/20
OS_AXIS: 085
OD_SPHERE: -3.25
OD_CYLINDER: -1.75
OS_VA1: 20/20
OD_ADD: +2.00
OS_CYLINDER: -2.25
OD_VA2: 20/J1
OS_AXIS: 081
OS_CYLINDER: -1.00
OS_ADD: +2.00
OS_VA1: 20/30
OD_VA1: 20/40

## 2023-07-01 ASSESSMENT — KERATOMETRY
OS_AXISANGLE_DEGREES: 013
OD_K1POWER_DIOPTERS: 47.25
OD_AXISANGLE_DEGREES: 023
METHOD_AUTO_MANUAL: AUTO
OD_K2POWER_DIOPTERS: 48.75
OS_K2POWER_DIOPTERS: 48.00
OS_K1POWER_DIOPTERS: 47.25

## 2023-07-01 ASSESSMENT — SPHEQUIV_DERIVED
OS_SPHEQUIV: -2
OS_SPHEQUIV: -2.875
OD_SPHEQUIV: -4.375
OD_SPHEQUIV: -4.125
OS_SPHEQUIV: -2.25
OD_SPHEQUIV: -3.875
OS_SPHEQUIV: -2.125
OD_SPHEQUIV: -4.375

## 2023-07-01 ASSESSMENT — AXIALLENGTH_DERIVED
OS_AL: 23.2034
OS_AL: 22.9241
OD_AL: 23.5462
OS_AL: 22.9702
OD_AL: 23.6436
OS_AL: 22.8782
OD_AL: 23.4496
OD_AL: 23.6436

## 2023-07-01 ASSESSMENT — CONFRONTATIONAL VISUAL FIELD TEST (CVF)
OD_FINDINGS: FULL
OS_FINDINGS: FULL

## 2023-07-01 ASSESSMENT — SUPERFICIAL PUNCTATE KERATITIS (SPK)
OD_SPK: 2+
OS_SPK: 2+

## 2023-07-01 ASSESSMENT — TONOMETRY
OD_IOP_MMHG: 14
OS_IOP_MMHG: 12

## 2023-07-01 ASSESSMENT — VISUAL ACUITY
OS_BCVA: 20/100-1
OD_BCVA: 20/30

## 2023-09-18 ENCOUNTER — TRANSCRIPTION ENCOUNTER (OUTPATIENT)
Age: 78
End: 2023-09-18

## 2023-09-19 ENCOUNTER — APPOINTMENT (OUTPATIENT)
Dept: GASTROENTEROLOGY | Facility: GI CENTER | Age: 78
End: 2023-09-19
Payer: MEDICARE

## 2023-09-19 ENCOUNTER — OUTPATIENT (OUTPATIENT)
Dept: OUTPATIENT SERVICES | Facility: HOSPITAL | Age: 78
LOS: 1 days | End: 2023-09-19
Payer: COMMERCIAL

## 2023-09-19 ENCOUNTER — RESULT REVIEW (OUTPATIENT)
Age: 78
End: 2023-09-19

## 2023-09-19 DIAGNOSIS — K64.9 UNSPECIFIED HEMORRHOIDS: ICD-10-CM

## 2023-09-19 DIAGNOSIS — K63.5 POLYP OF COLON: ICD-10-CM

## 2023-09-19 DIAGNOSIS — K57.30 DIVERTICULOSIS OF LARGE INTESTINE W/OUT PERFORATION OR ABSCESS W/OUT BLEEDING: ICD-10-CM

## 2023-09-19 PROCEDURE — 45380 COLONOSCOPY AND BIOPSY: CPT | Mod: PT

## 2023-09-19 PROCEDURE — 88305 TISSUE EXAM BY PATHOLOGIST: CPT | Mod: 26

## 2023-09-19 PROCEDURE — 88305 TISSUE EXAM BY PATHOLOGIST: CPT

## 2023-09-19 PROCEDURE — 45380 COLONOSCOPY AND BIOPSY: CPT

## 2023-09-27 LAB — SURGICAL PATHOLOGY STUDY: SIGNIFICANT CHANGE UP

## 2023-11-04 ENCOUNTER — OFFICE (OUTPATIENT)
Dept: URBAN - METROPOLITAN AREA CLINIC 112 | Facility: CLINIC | Age: 78
Setting detail: OPHTHALMOLOGY
End: 2023-11-04
Payer: MEDICARE

## 2023-11-04 DIAGNOSIS — H40.1123: ICD-10-CM

## 2023-11-04 PROCEDURE — 92014 COMPRE OPH EXAM EST PT 1/>: CPT | Performed by: OPHTHALMOLOGY

## 2023-11-04 PROCEDURE — 92250 FUNDUS PHOTOGRAPHY W/I&R: CPT | Performed by: OPHTHALMOLOGY

## 2023-11-04 ASSESSMENT — REFRACTION_MANIFEST
OS_ADD: +2.00
OS_AXIS: 085
OD_SPHERE: -3.25
OS_CYLINDER: -1.00
OD_VA1: 20/40
OD_CYLINDER: -1.25
OD_ADD: +2.00
OD_AXIS: 099
OD_VA2: 20/J1
OD_SPHERE: -3.25
OD_SPHERE: -3.25
OD_ADD: +2.00
OS_VA1: 20/20
OD_CYLINDER: -1.75
OD_VA1: 20/80
OS_SPHERE: -1.00
OS_SPHERE: -1.50
OS_AXIS: 081
OS_ADD: +2.00
OU_VA: 20/20
OS_VA1: 20/30
OS_SPHERE: -1.75
OS_VA2: 20/J1
OD_AXIS: 096
OD_VA1: 20/100
OD_AXIS: 090
OS_CYLINDER: -2.25
OS_CYLINDER: -2.25
OS_VA1: 20/20
OD_CYLINDER: -2.25
OS_AXIS: 089

## 2023-11-04 ASSESSMENT — SPHEQUIV_DERIVED
OS_SPHEQUIV: -2.25
OD_SPHEQUIV: -4.5
OD_SPHEQUIV: -3.875
OD_SPHEQUIV: -4.375
OS_SPHEQUIV: -2
OS_SPHEQUIV: -2.875
OD_SPHEQUIV: -4.125
OS_SPHEQUIV: -2.125

## 2023-11-04 ASSESSMENT — REFRACTION_AUTOREFRACTION
OS_CYLINDER: -1.50
OD_SPHERE: -3.50
OS_SPHERE: -1.50
OD_CYLINDER: -2.00
OD_AXIS: 106
OS_AXIS: 081

## 2023-11-04 ASSESSMENT — REFRACTION_CURRENTRX
OD_AXIS: 100
OD_VPRISM_DIRECTION: PROGS
OS_AXIS: 083
OS_VPRISM_DIRECTION: PROGS
OS_CYLINDER: -0.50
OD_ADD: +1.50
OD_AXIS: 092
OD_SPHERE: -4.00
OS_OVR_VA: 20/
OD_SPHERE: -3.00
OS_SPHERE: -3.25
OD_OVR_VA: 20/
OS_VPRISM_DIRECTION: PROGS
OS_OVR_VA: 20/
OD_ADD: +1.75
OS_OVR_VA: 20/
OS_ADD: +2.00
OS_ADD: +1.50
OD_OVR_VA: 20/
OD_VPRISM_DIRECTION: PROGS
OS_CYLINDER: -1.00
OS_AXIS: 105
OD_CYLINDER: -1.25
OD_VPRISM_DIRECTION: SV
OS_SPHERE: -1.50
OS_ADD: +3.50
OS_CYLINDER: -1.25
OS_SPHERE: -1.50
OD_OVR_VA: 20/
OD_AXIS: 091
OS_VPRISM_DIRECTION: PROGS
OD_CYLINDER: -1.25
OD_SPHERE: -3.00
OD_CYLINDER: -0.75
OS_AXIS: 082

## 2023-11-04 ASSESSMENT — SUPERFICIAL PUNCTATE KERATITIS (SPK)
OD_SPK: 2+
OS_SPK: 2+

## 2023-11-04 ASSESSMENT — CONFRONTATIONAL VISUAL FIELD TEST (CVF)
OD_FINDINGS: FULL
OS_FINDINGS: FULL

## 2024-03-09 ENCOUNTER — OFFICE (OUTPATIENT)
Dept: URBAN - METROPOLITAN AREA CLINIC 112 | Facility: CLINIC | Age: 79
Setting detail: OPHTHALMOLOGY
End: 2024-03-09
Payer: MEDICARE

## 2024-03-09 DIAGNOSIS — H40.1123: ICD-10-CM

## 2024-03-09 PROCEDURE — 92083 EXTENDED VISUAL FIELD XM: CPT | Performed by: OPHTHALMOLOGY

## 2024-03-09 PROCEDURE — 92014 COMPRE OPH EXAM EST PT 1/>: CPT | Performed by: OPHTHALMOLOGY

## 2024-03-09 PROCEDURE — 92020 GONIOSCOPY: CPT | Performed by: OPHTHALMOLOGY

## 2024-03-09 ASSESSMENT — REFRACTION_MANIFEST
OS_AXIS: 081
OS_VA1: 20/20
OD_CYLINDER: -1.75
OD_AXIS: 099
OU_VA: 20/20
OS_ADD: +2.00
OS_ADD: +2.00
OD_ADD: +2.00
OS_SPHERE: -1.75
OD_SPHERE: -3.25
OS_AXIS: 085
OS_SPHERE: -1.00
OD_AXIS: 090
OD_VA1: 20/80
OS_AXIS: 089
OS_CYLINDER: -2.25
OD_SPHERE: -3.25
OD_VA2: 20/J1
OD_SPHERE: -3.25
OS_SPHERE: -1.50
OD_AXIS: 096
OD_CYLINDER: -2.25
OS_CYLINDER: -1.00
OD_VA1: 20/40
OS_VA1: 20/30
OS_CYLINDER: -2.25
OD_CYLINDER: -1.25
OD_VA1: 20/100
OD_ADD: +2.00
OS_VA1: 20/20
OS_VA2: 20/J1

## 2024-03-09 ASSESSMENT — REFRACTION_CURRENTRX
OS_VPRISM_DIRECTION: PROGS
OD_OVR_VA: 20/
OD_SPHERE: -4.00
OS_AXIS: 082
OS_ADD: +3.50
OD_VPRISM_DIRECTION: SV
OD_AXIS: 092
OD_AXIS: 100
OD_AXIS: 091
OD_SPHERE: -3.00
OD_VPRISM_DIRECTION: PROGS
OS_VPRISM_DIRECTION: PROGS
OS_OVR_VA: 20/
OS_AXIS: 083
OD_OVR_VA: 20/
OS_OVR_VA: 20/
OD_CYLINDER: -1.25
OS_ADD: +1.50
OD_ADD: +1.50
OS_VPRISM_DIRECTION: PROGS
OD_SPHERE: -3.00
OS_CYLINDER: -1.25
OS_SPHERE: -3.25
OS_SPHERE: -1.50
OS_SPHERE: -1.50
OD_VPRISM_DIRECTION: PROGS
OS_OVR_VA: 20/
OD_CYLINDER: -1.25
OS_ADD: +2.00
OS_CYLINDER: -1.00
OD_CYLINDER: -0.75
OS_CYLINDER: -0.50
OS_AXIS: 105
OD_ADD: +1.75
OD_OVR_VA: 20/

## 2024-03-09 ASSESSMENT — SPHEQUIV_DERIVED
OD_SPHEQUIV: -3.875
OS_SPHEQUIV: -2.125
OD_SPHEQUIV: -4.375
OD_SPHEQUIV: -4.125
OS_SPHEQUIV: -2.875
OS_SPHEQUIV: -2

## 2024-06-28 ASSESSMENT — REFRACTION_CURRENTRX
OD_SPHERE: -3.00
OS_ADD: +1.50
OS_OVR_VA: 20/
OD_CYLINDER: -1.25
OD_SPHERE: -4.00
OS_AXIS: 105
OD_ADD: +1.75
OS_VPRISM_DIRECTION: PROGS
OD_ADD: +1.50
OS_CYLINDER: -1.25
OS_OVR_VA: 20/
OD_AXIS: 100
OS_SPHERE: -1.50
OD_SPHERE: -3.00
OS_AXIS: 082
OS_AXIS: 083
OD_OVR_VA: 20/
OS_CYLINDER: -0.50
OS_VPRISM_DIRECTION: PROGS
OD_OVR_VA: 20/
OD_VPRISM_DIRECTION: SV
OD_AXIS: 092
OS_ADD: +2.00
OS_CYLINDER: -1.00
OD_AXIS: 091
OS_ADD: +3.50
OD_CYLINDER: -0.75
OS_VPRISM_DIRECTION: PROGS
OS_SPHERE: -3.25
OD_OVR_VA: 20/
OD_CYLINDER: -1.25
OD_VPRISM_DIRECTION: PROGS
OD_VPRISM_DIRECTION: PROGS
OS_OVR_VA: 20/
OS_SPHERE: -1.50

## 2024-06-28 ASSESSMENT — REFRACTION_MANIFEST
OD_AXIS: 096
OD_CYLINDER: -2.25
OS_SPHERE: -1.00
OD_AXIS: 090
OD_VA1: 20/100
OS_CYLINDER: -1.00
OS_SPHERE: -1.75
OD_ADD: +2.00
OD_CYLINDER: -1.75
OD_SPHERE: -3.25
OS_AXIS: 081
OS_AXIS: 085
OS_VA1: 20/20
OS_SPHERE: -1.50
OU_VA: 20/20
OD_VA2: 20/J1
OS_CYLINDER: -2.25
OS_VA1: 20/30
OS_VA2: 20/J1
OD_ADD: +2.00
OD_SPHERE: -3.25
OS_ADD: +2.00
OS_AXIS: 089
OD_CYLINDER: -1.25
OS_CYLINDER: -2.25
OS_ADD: +2.00
OD_AXIS: 099
OD_VA1: 20/80
OD_SPHERE: -3.25
OD_VA1: 20/40
OS_VA1: 20/20

## 2024-06-28 ASSESSMENT — KERATOMETRY
OS_K2POWER_DIOPTERS: 48.25
OS_AXISANGLE_DEGREES: 180
OD_K2POWER_DIOPTERS: 48.50
OS_K1POWER_DIOPTERS: 47.25
METHOD_AUTO_MANUAL: AUTO
OD_AXISANGLE_DEGREES: 024
OD_K1POWER_DIOPTERS: 47.00

## 2024-07-06 ENCOUNTER — OFFICE (OUTPATIENT)
Dept: URBAN - METROPOLITAN AREA CLINIC 94 | Facility: CLINIC | Age: 79
Setting detail: OPHTHALMOLOGY
End: 2024-07-06
Payer: MEDICARE

## 2024-07-06 DIAGNOSIS — H40.1123: ICD-10-CM

## 2024-07-06 PROCEDURE — 92014 COMPRE OPH EXAM EST PT 1/>: CPT | Performed by: OPHTHALMOLOGY

## 2024-07-06 PROCEDURE — 92133 CPTRZD OPH DX IMG PST SGM ON: CPT | Performed by: OPHTHALMOLOGY

## 2024-07-06 ASSESSMENT — CONFRONTATIONAL VISUAL FIELD TEST (CVF)
OS_FINDINGS: FULL
OD_FINDINGS: FULL

## 2024-11-02 ENCOUNTER — OFFICE (OUTPATIENT)
Dept: URBAN - METROPOLITAN AREA CLINIC 112 | Facility: CLINIC | Age: 79
Setting detail: OPHTHALMOLOGY
End: 2024-11-02
Payer: MEDICARE

## 2024-11-02 DIAGNOSIS — H40.1133: ICD-10-CM

## 2024-11-02 DIAGNOSIS — H43.393: ICD-10-CM

## 2024-11-02 DIAGNOSIS — H16.223: ICD-10-CM

## 2024-11-02 DIAGNOSIS — Z96.1: ICD-10-CM

## 2024-11-02 PROCEDURE — 92250 FUNDUS PHOTOGRAPHY W/I&R: CPT | Performed by: OPHTHALMOLOGY

## 2024-11-02 PROCEDURE — 92014 COMPRE OPH EXAM EST PT 1/>: CPT | Performed by: OPHTHALMOLOGY

## 2024-11-02 ASSESSMENT — REFRACTION_MANIFEST
OS_VA1: 20/20
OD_SPHERE: -3.25
OD_VA1: 20/80
OS_CYLINDER: -1.00
OS_VA2: 20/J1
OD_CYLINDER: -1.25
OS_AXIS: 085
OS_ADD: +2.00
OS_AXIS: 089
OS_CYLINDER: -2.25
OS_VA1: 20/30
OS_SPHERE: -1.00
OU_VA: 20/20
OD_CYLINDER: -2.25
OD_VA1: 20/100
OD_VA2: 20/J1
OD_VA1: 20/40
OD_AXIS: 099
OD_CYLINDER: -1.75
OS_CYLINDER: -2.25
OS_AXIS: 081
OD_SPHERE: -3.25
OD_AXIS: 090
OD_ADD: +2.00
OD_ADD: +2.00
OS_SPHERE: -1.50
OD_SPHERE: -3.25
OD_AXIS: 096
OS_SPHERE: -1.75
OS_ADD: +2.00
OS_VA1: 20/20

## 2024-11-02 ASSESSMENT — REFRACTION_CURRENTRX
OS_SPHERE: -1.50
OS_VPRISM_DIRECTION: PROGS
OD_ADD: +1.50
OS_OVR_VA: 20/
OS_AXIS: 105
OD_CYLINDER: -1.25
OS_SPHERE: -3.25
OD_VPRISM_DIRECTION: SV
OD_ADD: +1.75
OD_OVR_VA: 20/
OS_CYLINDER: -0.50
OD_AXIS: 100
OS_OVR_VA: 20/
OD_VPRISM_DIRECTION: PROGS
OS_CYLINDER: -1.25
OD_CYLINDER: -0.75
OD_SPHERE: -3.00
OS_VPRISM_DIRECTION: PROGS
OS_ADD: +3.50
OS_VPRISM_DIRECTION: PROGS
OS_CYLINDER: -1.00
OD_AXIS: 091
OS_AXIS: 082
OS_OVR_VA: 20/
OS_ADD: +1.50
OD_VPRISM_DIRECTION: PROGS
OS_ADD: +2.00
OS_AXIS: 083
OD_AXIS: 092
OD_SPHERE: -3.00
OD_SPHERE: -4.00
OD_CYLINDER: -1.25
OD_OVR_VA: 20/
OD_OVR_VA: 20/
OS_SPHERE: -1.50

## 2024-11-02 ASSESSMENT — KERATOMETRY
OD_K2POWER_DIOPTERS: 48.50
OS_K2POWER_DIOPTERS: 48.00
OS_AXISANGLE_DEGREES: 179
OD_K1POWER_DIOPTERS: 47.00
OD_AXISANGLE_DEGREES: 020
METHOD_AUTO_MANUAL: AUTO
OS_K1POWER_DIOPTERS: 47.50

## 2024-11-02 ASSESSMENT — SUPERFICIAL PUNCTATE KERATITIS (SPK)
OS_SPK: 2+
OD_SPK: 2+

## 2024-11-02 ASSESSMENT — PACHYMETRY
OD_CT_CORRECTION: 3
OS_CT_CORRECTION: 5
OD_CT_UM: 508
OS_CT_UM: 479

## 2024-11-02 ASSESSMENT — CONFRONTATIONAL VISUAL FIELD TEST (CVF)
OD_FINDINGS: FULL
OS_FINDINGS: FULL

## 2024-11-02 ASSESSMENT — REFRACTION_AUTOREFRACTION
OD_SPHERE: -3.00
OD_AXIS: 105
OD_CYLINDER: -2.25
OS_SPHERE: -1.75
OS_CYLINDER: -1.50
OS_AXIS: 089

## 2024-11-02 ASSESSMENT — TONOMETRY
OD_IOP_MMHG: 11
OS_IOP_MMHG: 11

## 2024-11-02 ASSESSMENT — VISUAL ACUITY
OD_BCVA: 20/25
OS_BCVA: 20/100-1

## 2025-03-08 ENCOUNTER — OFFICE (OUTPATIENT)
Dept: URBAN - METROPOLITAN AREA CLINIC 112 | Facility: CLINIC | Age: 80
Setting detail: OPHTHALMOLOGY
End: 2025-03-08
Payer: COMMERCIAL

## 2025-03-08 DIAGNOSIS — H40.1133: ICD-10-CM

## 2025-03-08 DIAGNOSIS — H43.393: ICD-10-CM

## 2025-03-08 DIAGNOSIS — H16.223: ICD-10-CM

## 2025-03-08 PROCEDURE — 92083 EXTENDED VISUAL FIELD XM: CPT | Performed by: OPHTHALMOLOGY

## 2025-03-08 PROCEDURE — 99213 OFFICE O/P EST LOW 20 MIN: CPT | Performed by: OPHTHALMOLOGY

## 2025-03-08 ASSESSMENT — REFRACTION_CURRENTRX
OS_AXIS: 105
OS_ADD: +1.50
OD_VPRISM_DIRECTION: PROGS
OD_VPRISM_DIRECTION: SV
OD_VPRISM_DIRECTION: PROGS
OS_CYLINDER: -1.00
OD_SPHERE: -3.00
OD_ADD: +1.75
OS_OVR_VA: 20/
OS_VPRISM_DIRECTION: PROGS
OS_AXIS: 083
OD_AXIS: 100
OS_VPRISM_DIRECTION: PROGS
OD_AXIS: 092
OS_CYLINDER: -1.25
OD_OVR_VA: 20/
OS_SPHERE: -1.50
OD_AXIS: 091
OD_OVR_VA: 20/
OD_ADD: +1.50
OD_CYLINDER: -0.75
OD_CYLINDER: -1.25
OD_OVR_VA: 20/
OS_AXIS: 082
OS_SPHERE: -1.50
OD_SPHERE: -4.00
OS_VPRISM_DIRECTION: PROGS
OS_ADD: +2.00
OS_SPHERE: -3.25
OS_OVR_VA: 20/
OS_ADD: +3.50
OS_OVR_VA: 20/
OS_CYLINDER: -0.50
OD_CYLINDER: -1.25
OD_SPHERE: -3.00

## 2025-03-08 ASSESSMENT — SUPERFICIAL PUNCTATE KERATITIS (SPK)
OS_SPK: 2+
OD_SPK: 2+

## 2025-03-08 ASSESSMENT — PACHYMETRY
OS_CT_CORRECTION: 5
OD_CT_UM: 508
OD_CT_CORRECTION: 3
OS_CT_UM: 479

## 2025-03-08 ASSESSMENT — REFRACTION_MANIFEST
OD_AXIS: 090
OS_ADD: +2.00
OS_CYLINDER: -2.25
OD_AXIS: 096
OD_CYLINDER: -2.25
OS_VA1: 20/20
OD_CYLINDER: -1.75
OD_SPHERE: -3.25
OD_SPHERE: -3.25
OU_VA: 20/20
OD_VA1: 20/100
OS_ADD: +2.00
OD_VA1: 20/40
OS_SPHERE: -1.75
OD_ADD: +2.00
OS_AXIS: 085
OD_VA1: 20/80
OD_SPHERE: -3.25
OS_VA1: 20/20
OS_AXIS: 089
OS_AXIS: 081
OS_SPHERE: -1.00
OS_CYLINDER: -1.00
OD_AXIS: 099
OS_VA2: 20/J1
OS_SPHERE: -1.50
OS_CYLINDER: -2.25
OS_VA1: 20/30
OD_VA2: 20/J1
OD_ADD: +2.00
OD_CYLINDER: -1.25

## 2025-03-08 ASSESSMENT — CONFRONTATIONAL VISUAL FIELD TEST (CVF)
OS_FINDINGS: FULL
OD_FINDINGS: FULL

## 2025-03-08 ASSESSMENT — REFRACTION_AUTOREFRACTION
OS_SPHERE: -1.50
OS_AXIS: 082
OS_CYLINDER: -1.50
OD_AXIS: 101
OD_SPHERE: -3.25
OD_CYLINDER: -2.50

## 2025-03-08 ASSESSMENT — KERATOMETRY
OS_K2POWER_DIOPTERS: 44.75
METHOD_AUTO_MANUAL: AUTO
OD_K2POWER_DIOPTERS: 48.75
OD_AXISANGLE_DEGREES: 024
OD_K1POWER_DIOPTERS: 47.25
OS_AXISANGLE_DEGREES: 124
OS_K1POWER_DIOPTERS: 47.28

## 2025-03-08 ASSESSMENT — VISUAL ACUITY
OD_BCVA: 20/30-1
OS_BCVA: 20/150

## 2025-03-08 ASSESSMENT — TONOMETRY
OS_IOP_MMHG: 14
OD_IOP_MMHG: 15

## 2025-08-02 ENCOUNTER — OFFICE (OUTPATIENT)
Dept: URBAN - METROPOLITAN AREA CLINIC 112 | Facility: CLINIC | Age: 80
Setting detail: OPHTHALMOLOGY
End: 2025-08-02
Payer: COMMERCIAL

## 2025-08-02 DIAGNOSIS — H40.1133: ICD-10-CM

## 2025-08-02 DIAGNOSIS — H16.223: ICD-10-CM

## 2025-08-02 DIAGNOSIS — Z96.1: ICD-10-CM

## 2025-08-02 DIAGNOSIS — H43.393: ICD-10-CM

## 2025-08-02 PROCEDURE — 92014 COMPRE OPH EXAM EST PT 1/>: CPT | Performed by: OPHTHALMOLOGY

## 2025-08-02 PROCEDURE — 92133 CPTRZD OPH DX IMG PST SGM ON: CPT | Performed by: OPHTHALMOLOGY

## 2025-08-02 PROCEDURE — 92020 GONIOSCOPY: CPT | Performed by: OPHTHALMOLOGY

## 2025-08-02 ASSESSMENT — REFRACTION_MANIFEST
OD_VA1: 20/100
OS_ADD: +2.00
OS_CYLINDER: -2.25
OD_VA1: 20/40
OS_VA1: 20/20
OS_CYLINDER: -2.25
OU_VA: 20/20
OD_CYLINDER: -2.25
OD_VA2: 20/J1
OD_AXIS: 099
OS_ADD: +2.00
OD_SPHERE: -3.25
OS_SPHERE: -1.00
OD_SPHERE: -3.25
OS_VA1: 20/20
OS_VA2: 20/J1
OS_AXIS: 081
OD_ADD: +2.00
OD_ADD: +2.00
OS_CYLINDER: -1.00
OD_SPHERE: -3.25
OS_AXIS: 085
OS_AXIS: 089
OD_AXIS: 090
OS_VA1: 20/30
OD_CYLINDER: -1.75
OD_CYLINDER: -1.25
OD_VA1: 20/80
OS_SPHERE: -1.75
OD_AXIS: 096
OS_SPHERE: -1.50

## 2025-08-02 ASSESSMENT — CONFRONTATIONAL VISUAL FIELD TEST (CVF)
OS_FINDINGS: FULL
OD_FINDINGS: FULL

## 2025-08-02 ASSESSMENT — REFRACTION_CURRENTRX
OD_ADD: +1.75
OD_CYLINDER: -1.25
OS_VPRISM_DIRECTION: PROGS
OS_VPRISM_DIRECTION: PROGS
OD_OVR_VA: 20/
OS_ADD: +1.50
OS_OVR_VA: 20/
OS_VPRISM_DIRECTION: PROGS
OS_SPHERE: -3.25
OS_AXIS: 082
OS_CYLINDER: -1.00
OD_OVR_VA: 20/
OS_SPHERE: -1.50
OD_CYLINDER: -0.75
OS_CYLINDER: -1.25
OD_SPHERE: -3.00
OD_VPRISM_DIRECTION: PROGS
OD_SPHERE: -4.00
OD_VPRISM_DIRECTION: SV
OD_VPRISM_DIRECTION: PROGS
OS_AXIS: 105
OD_SPHERE: -3.00
OD_OVR_VA: 20/
OS_OVR_VA: 20/
OD_AXIS: 100
OD_CYLINDER: -1.25
OD_ADD: +1.50
OS_SPHERE: -1.50
OD_AXIS: 092
OD_AXIS: 091
OS_AXIS: 083
OS_CYLINDER: -0.50
OS_ADD: +2.00
OS_OVR_VA: 20/
OS_ADD: +3.50

## 2025-08-02 ASSESSMENT — VISUAL ACUITY
OD_BCVA: 20/30-1
OS_BCVA: 20/150

## 2025-08-02 ASSESSMENT — PACHYMETRY
OS_CT_CORRECTION: 5
OD_CT_UM: 508
OD_CT_CORRECTION: 3
OS_CT_UM: 479

## 2025-08-02 ASSESSMENT — KERATOMETRY
OS_K1POWER_DIOPTERS: 47.50
OS_AXISANGLE_DEGREES: 173
OD_AXISANGLE_DEGREES: 020
METHOD_AUTO_MANUAL: AUTO
OS_K2POWER_DIOPTERS: 48.25
OD_K2POWER_DIOPTERS: 48.50
OD_K1POWER_DIOPTERS: 47.25

## 2025-08-02 ASSESSMENT — REFRACTION_AUTOREFRACTION
OS_SPHERE: -1.75
OD_CYLINDER: -2.25
OS_CYLINDER: -1.25
OS_AXIS: 092
OD_SPHERE: --2.75
OD_AXIS: 106

## 2025-08-02 ASSESSMENT — TONOMETRY
OS_IOP_MMHG: 10
OD_IOP_MMHG: 12

## 2025-08-02 ASSESSMENT — SUPERFICIAL PUNCTATE KERATITIS (SPK)
OS_SPK: 2+
OD_SPK: 2+

## (undated) DEVICE — VALVE ENDO SURESEAL II 0-5FR

## (undated) DEVICE — FORCEP RADIAL JAW 4 240CM DISP